# Patient Record
Sex: MALE | Race: WHITE | Employment: OTHER | URBAN - METROPOLITAN AREA
[De-identification: names, ages, dates, MRNs, and addresses within clinical notes are randomized per-mention and may not be internally consistent; named-entity substitution may affect disease eponyms.]

---

## 2020-09-10 ENCOUNTER — LAB ENCOUNTER (OUTPATIENT)
Dept: LAB | Facility: HOSPITAL | Age: 45
End: 2020-09-10
Attending: FAMILY MEDICINE
Payer: COMMERCIAL

## 2020-09-10 DIAGNOSIS — Z20.828 EXPOSURE TO SARS-ASSOCIATED CORONAVIRUS: Primary | ICD-10-CM

## 2020-09-10 DIAGNOSIS — Z11.59 ENCOUNTER FOR SCREENING FOR OTHER VIRAL DISEASES: ICD-10-CM

## 2020-09-10 DIAGNOSIS — Z20.828 EXPOSURE TO SARS-ASSOCIATED CORONAVIRUS: ICD-10-CM

## 2020-09-10 DIAGNOSIS — Z11.59 ENCOUNTER FOR SCREENING FOR OTHER VIRAL DISEASES: Primary | ICD-10-CM

## 2020-09-12 LAB — SARS-COV-2 RNA RESP QL NAA+PROBE: NOT DETECTED

## 2021-12-07 PROBLEM — Z12.11 COLON CANCER SCREENING: Status: ACTIVE | Noted: 2021-12-07

## 2022-03-17 ENCOUNTER — OFFICE VISIT (OUTPATIENT)
Dept: OTOLARYNGOLOGY | Facility: CLINIC | Age: 47
End: 2022-03-17
Payer: COMMERCIAL

## 2022-03-17 VITALS — BODY MASS INDEX: 26.46 KG/M2 | HEIGHT: 71 IN | WEIGHT: 189 LBS

## 2022-03-17 DIAGNOSIS — J31.0 CHRONIC RHINITIS: ICD-10-CM

## 2022-03-17 DIAGNOSIS — H61.23 BILATERAL IMPACTED CERUMEN: Primary | ICD-10-CM

## 2022-03-17 PROCEDURE — 3008F BODY MASS INDEX DOCD: CPT | Performed by: OTOLARYNGOLOGY

## 2022-03-17 PROCEDURE — 69210 REMOVE IMPACTED EAR WAX UNI: CPT | Performed by: OTOLARYNGOLOGY

## 2022-03-17 RX ORDER — FLUTICASONE PROPIONATE 50 MCG
2 SPRAY, SUSPENSION (ML) NASAL DAILY
Qty: 3 EACH | Refills: 4 | Status: SHIPPED | OUTPATIENT
Start: 2022-03-17

## 2022-03-17 RX ORDER — FLUTICASONE PROPIONATE 50 MCG
2 SPRAY, SUSPENSION (ML) NASAL DAILY
Qty: 3 EACH | Refills: 4 | Status: SHIPPED | OUTPATIENT
Start: 2022-03-17 | End: 2022-03-17

## 2022-06-10 ENCOUNTER — APPOINTMENT (OUTPATIENT)
Dept: GENERAL RADIOLOGY | Age: 47
End: 2022-06-10
Attending: EMERGENCY MEDICINE
Payer: COMMERCIAL

## 2022-06-10 ENCOUNTER — HOSPITAL ENCOUNTER (OUTPATIENT)
Age: 47
Discharge: HOME | End: 2022-06-10
Attending: EMERGENCY MEDICINE
Payer: COMMERCIAL

## 2022-06-10 VITALS
DIASTOLIC BLOOD PRESSURE: 74 MMHG | TEMPERATURE: 99 F | OXYGEN SATURATION: 100 % | SYSTOLIC BLOOD PRESSURE: 114 MMHG | HEART RATE: 89 BPM | RESPIRATION RATE: 18 BRPM

## 2022-06-10 DIAGNOSIS — S62.657A NONDISPLACED FRACTURE OF MIDDLE PHALANX OF LEFT LITTLE FINGER, INITIAL ENCOUNTER FOR CLOSED FRACTURE: Primary | ICD-10-CM

## 2022-06-10 DIAGNOSIS — B37.2 CANDIDAL INTERTRIGO: ICD-10-CM

## 2022-06-10 PROCEDURE — 26720 TREAT FINGER FRACTURE EACH: CPT

## 2022-06-10 PROCEDURE — 99213 OFFICE O/P EST LOW 20 MIN: CPT

## 2022-06-10 PROCEDURE — 73140 X-RAY EXAM OF FINGER(S): CPT | Performed by: EMERGENCY MEDICINE

## 2022-06-10 PROCEDURE — 99203 OFFICE O/P NEW LOW 30 MIN: CPT

## 2022-06-10 RX ORDER — CLOTRIMAZOLE 1 %
1 CREAM (GRAM) TOPICAL 2 TIMES DAILY
Qty: 12 G | Refills: 0 | Status: SHIPPED | OUTPATIENT
Start: 2022-06-10 | End: 2022-06-15

## 2022-06-10 NOTE — ED INITIAL ASSESSMENT (HPI)
Injured left 5th finger while moving boxes 2 weeks ago. Bruising and swelling present. Little improvement since injury.

## 2022-12-04 PROBLEM — M15.0 PRIMARY OSTEOARTHRITIS INVOLVING MULTIPLE JOINTS: Status: ACTIVE | Noted: 2022-12-04

## 2022-12-04 PROBLEM — Z86.0100 HISTORY OF COLON POLYPS: Status: ACTIVE | Noted: 2022-12-04

## 2022-12-04 PROBLEM — J30.2 SEASONAL ALLERGIC RHINITIS: Status: ACTIVE | Noted: 2022-12-04

## 2022-12-04 PROBLEM — Z86.010 HISTORY OF COLON POLYPS: Status: ACTIVE | Noted: 2022-12-04

## 2022-12-04 PROBLEM — M15.9 PRIMARY OSTEOARTHRITIS INVOLVING MULTIPLE JOINTS: Status: ACTIVE | Noted: 2022-12-04

## 2022-12-04 NOTE — PATIENT INSTRUCTIONS
- You were seen in clinic for regular annual check-up. We have ordered labs for you and we will call you with the results.   -For your urinary symptoms, will check a urine test and a prostate level given your family history of prostate cancer. We will try to decrease the liquids 2 hours before bedtime. Maintain all your liquid intake in the morning during the daytime and try to cut off after dinner  - For the skin rash, this does look like a fungal infection. Lets try a course of clotrimazole-betamethasone 1 application twice a day for at least 7 days or until symptoms improve. You may use on an as-needed basis  - You do have some sebaceous cysts or possible small lipomas, as long as they do not enlarge or not causing any problems we can watch them for now.  -Your next colonoscopy will be due 2028 with Dr. Adina Ceballos  -Vaccines you may be due for: Tetanus shot, COVID dose #4, flu shot  - Please continue to eat a varied diet including recommended servings of vegetables, fruits, and low fat dairy. Minimize high saturated fats (such as fast foods) and high sugar intake (such as soda)  - We recommend 150 minutes of moderate intensity exercise (brisk walking, swimming) weekly to maintain your current weight. Targeted weight loss will require more vigorous exercise or more than 150 minutes/week. Return to clinic in 6-12 months for follow-up    Feel free to call, send a [employer] message, or make a sooner appointment if any new medical concerns arise.

## 2022-12-06 ENCOUNTER — OFFICE VISIT (OUTPATIENT)
Dept: INTERNAL MEDICINE CLINIC | Facility: CLINIC | Age: 47
End: 2022-12-06
Payer: COMMERCIAL

## 2022-12-06 VITALS
BODY MASS INDEX: 26.6 KG/M2 | DIASTOLIC BLOOD PRESSURE: 66 MMHG | HEIGHT: 71 IN | OXYGEN SATURATION: 99 % | WEIGHT: 190 LBS | TEMPERATURE: 98 F | HEART RATE: 62 BPM | SYSTOLIC BLOOD PRESSURE: 110 MMHG

## 2022-12-06 DIAGNOSIS — Z13.29 SCREENING FOR THYROID DISORDER: ICD-10-CM

## 2022-12-06 DIAGNOSIS — L72.3 SEBACEOUS CYST: ICD-10-CM

## 2022-12-06 DIAGNOSIS — R35.1 NOCTURIA: ICD-10-CM

## 2022-12-06 DIAGNOSIS — Z80.42 FAMILY HISTORY OF PROSTATE CANCER: ICD-10-CM

## 2022-12-06 DIAGNOSIS — Z13.220 SCREENING FOR LIPOID DISORDERS: ICD-10-CM

## 2022-12-06 DIAGNOSIS — Z13.0 SCREENING FOR DEFICIENCY ANEMIA: ICD-10-CM

## 2022-12-06 DIAGNOSIS — M15.9 PRIMARY OSTEOARTHRITIS INVOLVING MULTIPLE JOINTS: ICD-10-CM

## 2022-12-06 DIAGNOSIS — Z00.00 ANNUAL PHYSICAL EXAM: Primary | ICD-10-CM

## 2022-12-06 DIAGNOSIS — Z13.1 SCREENING FOR DIABETES MELLITUS: ICD-10-CM

## 2022-12-06 DIAGNOSIS — Z86.010 HISTORY OF COLON POLYPS: ICD-10-CM

## 2022-12-06 DIAGNOSIS — J30.2 SEASONAL ALLERGIC RHINITIS, UNSPECIFIED TRIGGER: ICD-10-CM

## 2022-12-06 PROCEDURE — 3008F BODY MASS INDEX DOCD: CPT | Performed by: INTERNAL MEDICINE

## 2022-12-06 PROCEDURE — 90471 IMMUNIZATION ADMIN: CPT | Performed by: INTERNAL MEDICINE

## 2022-12-06 PROCEDURE — 3074F SYST BP LT 130 MM HG: CPT | Performed by: INTERNAL MEDICINE

## 2022-12-06 PROCEDURE — 3078F DIAST BP <80 MM HG: CPT | Performed by: INTERNAL MEDICINE

## 2022-12-06 PROCEDURE — 90686 IIV4 VACC NO PRSV 0.5 ML IM: CPT | Performed by: INTERNAL MEDICINE

## 2022-12-06 PROCEDURE — 99396 PREV VISIT EST AGE 40-64: CPT | Performed by: INTERNAL MEDICINE

## 2022-12-06 RX ORDER — ASCORBIC ACID 500 MG
500 TABLET ORAL DAILY
COMMUNITY

## 2022-12-06 RX ORDER — CLOTRIMAZOLE AND BETAMETHASONE DIPROPIONATE 10; .64 MG/G; MG/G
1 CREAM TOPICAL 2 TIMES DAILY PRN
Qty: 60 G | Refills: 0 | Status: SHIPPED | OUTPATIENT
Start: 2022-12-06

## 2022-12-07 PROBLEM — R35.1 NOCTURIA: Status: ACTIVE | Noted: 2022-12-07

## 2022-12-07 PROBLEM — L72.3 SEBACEOUS CYST: Status: ACTIVE | Noted: 2022-12-07

## 2022-12-08 ENCOUNTER — LAB ENCOUNTER (OUTPATIENT)
Dept: LAB | Age: 47
End: 2022-12-08
Attending: INTERNAL MEDICINE
Payer: COMMERCIAL

## 2022-12-08 DIAGNOSIS — Z80.42 FAMILY HISTORY OF PROSTATE CANCER: ICD-10-CM

## 2022-12-08 LAB
ALBUMIN SERPL-MCNC: 3.7 G/DL (ref 3.4–5)
ALBUMIN/GLOB SERPL: 1.1 {RATIO} (ref 1–2)
ALP LIVER SERPL-CCNC: 40 U/L
ALT SERPL-CCNC: 40 U/L
ANION GAP SERPL CALC-SCNC: 4 MMOL/L (ref 0–18)
AST SERPL-CCNC: 15 U/L (ref 15–37)
BASOPHILS # BLD AUTO: 0.02 X10(3) UL (ref 0–0.2)
BASOPHILS NFR BLD AUTO: 0.3 %
BILIRUB SERPL-MCNC: 0.8 MG/DL (ref 0.1–2)
BILIRUB UR QL: NEGATIVE
BUN BLD-MCNC: 16 MG/DL (ref 7–18)
BUN/CREAT SERPL: 16 (ref 10–20)
CALCIUM BLD-MCNC: 9.2 MG/DL (ref 8.5–10.1)
CHLORIDE SERPL-SCNC: 110 MMOL/L (ref 98–112)
CHOLEST SERPL-MCNC: 184 MG/DL (ref ?–200)
CLARITY UR: CLEAR
CO2 SERPL-SCNC: 29 MMOL/L (ref 21–32)
COLOR UR: YELLOW
COMPLEXED PSA SERPL-MCNC: 1.06 NG/ML (ref ?–4)
CREAT BLD-MCNC: 1 MG/DL
DEPRECATED RDW RBC AUTO: 43.9 FL (ref 35.1–46.3)
EOSINOPHIL # BLD AUTO: 0.1 X10(3) UL (ref 0–0.7)
EOSINOPHIL NFR BLD AUTO: 1.6 %
ERYTHROCYTE [DISTWIDTH] IN BLOOD BY AUTOMATED COUNT: 13.2 % (ref 11–15)
FASTING PATIENT LIPID ANSWER: YES
FASTING STATUS PATIENT QL REPORTED: YES
GFR SERPLBLD BASED ON 1.73 SQ M-ARVRAT: 94 ML/MIN/1.73M2 (ref 60–?)
GLOBULIN PLAS-MCNC: 3.4 G/DL (ref 2.8–4.4)
GLUCOSE BLD-MCNC: 103 MG/DL (ref 70–99)
GLUCOSE UR-MCNC: NEGATIVE MG/DL
HCT VFR BLD AUTO: 44.8 %
HDLC SERPL-MCNC: 57 MG/DL (ref 40–59)
HGB BLD-MCNC: 15.2 G/DL
HGB UR QL STRIP.AUTO: NEGATIVE
IMM GRANULOCYTES # BLD AUTO: 0.02 X10(3) UL (ref 0–1)
IMM GRANULOCYTES NFR BLD: 0.3 %
LDLC SERPL CALC-MCNC: 113 MG/DL (ref ?–100)
LEUKOCYTE ESTERASE UR QL STRIP.AUTO: NEGATIVE
LYMPHOCYTES # BLD AUTO: 1.9 X10(3) UL (ref 1–4)
LYMPHOCYTES NFR BLD AUTO: 29.7 %
MCH RBC QN AUTO: 30.9 PG (ref 26–34)
MCHC RBC AUTO-ENTMCNC: 33.9 G/DL (ref 31–37)
MCV RBC AUTO: 91.1 FL
MONOCYTES # BLD AUTO: 0.48 X10(3) UL (ref 0.1–1)
MONOCYTES NFR BLD AUTO: 7.5 %
NEUTROPHILS # BLD AUTO: 3.88 X10 (3) UL (ref 1.5–7.7)
NEUTROPHILS # BLD AUTO: 3.88 X10(3) UL (ref 1.5–7.7)
NEUTROPHILS NFR BLD AUTO: 60.6 %
NITRITE UR QL STRIP.AUTO: NEGATIVE
NONHDLC SERPL-MCNC: 127 MG/DL (ref ?–130)
OSMOLALITY SERPL CALC.SUM OF ELEC: 297 MOSM/KG (ref 275–295)
PH UR: 6 [PH] (ref 5–8)
PLATELET # BLD AUTO: 184 10(3)UL (ref 150–450)
POTASSIUM SERPL-SCNC: 4 MMOL/L (ref 3.5–5.1)
PROT SERPL-MCNC: 7.1 G/DL (ref 6.4–8.2)
PROT UR-MCNC: NEGATIVE MG/DL
RBC # BLD AUTO: 4.92 X10(6)UL
SODIUM SERPL-SCNC: 143 MMOL/L (ref 136–145)
SP GR UR STRIP: >=1.03 (ref 1–1.03)
TRIGL SERPL-MCNC: 73 MG/DL (ref 30–149)
TSI SER-ACNC: 0.36 MIU/ML (ref 0.36–3.74)
UROBILINOGEN UR STRIP-ACNC: 0.2
VLDLC SERPL CALC-MCNC: 13 MG/DL (ref 0–30)
WBC # BLD AUTO: 6.4 X10(3) UL (ref 4–11)

## 2022-12-08 PROCEDURE — 85025 COMPLETE CBC W/AUTO DIFF WBC: CPT | Performed by: INTERNAL MEDICINE

## 2022-12-08 PROCEDURE — 80053 COMPREHEN METABOLIC PANEL: CPT | Performed by: INTERNAL MEDICINE

## 2022-12-08 PROCEDURE — 84443 ASSAY THYROID STIM HORMONE: CPT | Performed by: INTERNAL MEDICINE

## 2022-12-08 PROCEDURE — 36415 COLL VENOUS BLD VENIPUNCTURE: CPT | Performed by: INTERNAL MEDICINE

## 2022-12-08 PROCEDURE — 80061 LIPID PANEL: CPT | Performed by: INTERNAL MEDICINE

## 2022-12-08 PROCEDURE — 81003 URINALYSIS AUTO W/O SCOPE: CPT | Performed by: INTERNAL MEDICINE

## 2022-12-10 ENCOUNTER — TELEPHONE (OUTPATIENT)
Dept: INTERNAL MEDICINE CLINIC | Facility: CLINIC | Age: 47
End: 2022-12-10

## 2022-12-10 NOTE — TELEPHONE ENCOUNTER
Please notify patient that I reviewed the blood work from 12/8    Labs  Liver, kidney, electrolytes, blood counts, thyroid all look  Cholesterol is only borderline elevated (ASCVD 1.3% no medications warranted, continue targeted weight loss over time  Prostate level is normal and urinary tests also look good        Recommendations  No medications that we need at this time.   Lets see if we can reduce fluids right before bedtime but stay hydrated during the day  He is doing good job with nutrition and exercise and this should help control his cholesterol further

## 2022-12-14 ENCOUNTER — TELEPHONE (OUTPATIENT)
Dept: INTERNAL MEDICINE CLINIC | Facility: CLINIC | Age: 47
End: 2022-12-14

## 2022-12-14 NOTE — TELEPHONE ENCOUNTER
Did receive outside hospital records    Labs 12/21/2021  - CBC within normal limits  - Total cholesterol 212, , HDL 60, triglycerides 66  - CMP: Unremarkable  - TSH within normal    Office visit 12/27/2021-Dr. Lyon  Swelling of lymph nodes after COVID booster #2 history of GERD, allergic asthma  - C-scope 1 polyp 2016, bilateral herniorrhaphy 1976  - Family history: Mother pancreatic cancer, diabetes  - Maternal grandfather colon cancer  - Maternal cousin with colon polyps    Nystatin topical 100,000 units/g powder 1 application 3 times a day      Office visit 12/1/2021  - New patient physical  - Concern for osteoarthritis of both knees, referred to orthopedic surgery Dr. Pancho Aquino  - Allergic rhinitis: Flonase, antihistamine  - Tinea corporis on nystatin topical powder    Sent to scanning.

## 2023-03-02 ENCOUNTER — HOSPITAL ENCOUNTER (OUTPATIENT)
Age: 48
Discharge: HOME | End: 2023-03-02
Payer: COMMERCIAL

## 2023-03-02 VITALS
SYSTOLIC BLOOD PRESSURE: 107 MMHG | TEMPERATURE: 97 F | RESPIRATION RATE: 16 BRPM | DIASTOLIC BLOOD PRESSURE: 73 MMHG | OXYGEN SATURATION: 100 % | HEART RATE: 68 BPM

## 2023-03-02 DIAGNOSIS — H60.501 ACUTE OTITIS EXTERNA OF RIGHT EAR, UNSPECIFIED TYPE: ICD-10-CM

## 2023-03-02 DIAGNOSIS — H61.23 BILATERAL IMPACTED CERUMEN: Primary | ICD-10-CM

## 2023-03-02 PROCEDURE — 99203 OFFICE O/P NEW LOW 30 MIN: CPT

## 2023-03-02 RX ORDER — OFLOXACIN 3 MG/ML
5 SOLUTION AURICULAR (OTIC) DAILY
Qty: 10 ML | Refills: 0 | Status: SHIPPED | OUTPATIENT
Start: 2023-03-02 | End: 2023-03-09

## 2023-03-02 NOTE — ED INITIAL ASSESSMENT (HPI)
Pt complaining of cold symptoms since last week. Pt states now has bilateral ear fullness. No ear pain.

## 2024-01-21 NOTE — H&P
Juan Antonio Cadet is a 48 year old male.    HPI:     Chief Complaint   Patient presents with    Physical     Here today for Annual Physical Exam     Mr. CADET is a 48 year old male osteoarthritis, allergic rhinitis, anxiety coming in for annual physical examination    Overall doing well. Work is keeping busy. Doing well with the family.    R foot plantar fasciitis for the last 6 months. Worse with being on the feet. Using new orthotics. Trying to use supportive shoes. He can still run.     Tchol 219 was   BMI has increased      Has family history of cysts in the family. R maxillary cyst was removed at 19. R forearm cyst x 25 years. Same size, not bothering him. Mostly on the R side. R abdomen, R thigh with some spots.     He was hoping to have some spots removed in the right forearm.  There are some red spots that are small and have come up.  He does have stable growths of the right forearm, right thigh, right abdomen.    I reviewed and updated the PMHx, FamHx, medications, allergies, and SocHx as below with the patient    SocHX  - Home: feels safe at home; wife and 3 kids.  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  - Sexual Activity: with wife  - Hobbies: exercise, traveling,   - Nutrition: salads, veggies. May snack throughout the day. Sometimes eats out.   - Physical Activity: [employer] - full body exercises, squatting can cause pain. He did cross fit (lots of squatting); he does stay away from the squats.      HISTORY:  Past Medical History:   Diagnosis Date    Anxiety state       Past Surgical History:   Procedure Laterality Date    COLONOSCOPY N/A 12/7/2021    Procedure: COLONOSCOPY with polypectomy;  Surgeon: Art Pantoja MD;  [facility] [facility]    HERNIA SURGERY      as a child      Family History   Problem Relation Age of Onset    Cancer Mother         pancreatic    Arthritis Father         Psoriatic arthritis    Cancer Maternal  Grandfather         prostate    Cancer Other         Colon cancer - cousin      Social History:   Social History     Socioeconomic History    Marital status:    Tobacco Use    Smoking status: Never    Smokeless tobacco: Never   Vaping Use    Vaping Use: Never used   Substance and Sexual Activity    Alcohol use: Yes     Comment: rarely    Drug use: Never        Medications (Active prior to today's visit):  Current Outpatient Medications   Medication Sig Dispense Refill    Cholecalciferol 125 MCG (5000 UT) Oral Tab Take 1 tablet (5,000 Units total) by mouth daily.      Vitamin C 500 MG Oral Tab Take 1 tablet (500 mg total) by mouth daily.      B Complex-C-Folic Acid Oral Tab Take by mouth as needed.      clotrimazole-betamethasone 1-0.05 % External Cream Apply 1 Application. topically 2 (two) times daily as needed. 60 g 0       Allergies:  Allergies   Allergen Reactions    Penicillins UNKNOWN, HIVES and OTHER (SEE COMMENTS)         ROS:   Positive Findings indicated in BOLD    Constitutional: Fever, Chills, Weight Gain, Weight Loss, Night Sweats, Fatigue, Malaise  ENT/Mouth:  Hearing Changes, Ear Pain, Nasal Congestion, Sinus Pain, Hoarseness, Sore throat, Rhinorrhea, Swallowing Difficulty  Eyes: Eye Pain, Swelling, Redness, Foreign Body, Discharge, Vision Changes  Cardiovascular: Chest Pain, SOB, PND, Dyspnea on Exertion, Orthopnea, Claudication, Edema, Palpitations  Respiratory: Cough, Sputum, Wheezing, Shortness of breath  Gastrointestinal: Nausea, Vomiting, Diarrhea, Constipation, Pain, Heartburn, Dysphagia, Bloody stools, Tarry stools  Genitourinary: Dysmenorrhea, Dysuria, Urinary Frequency, Hematuria, Urinary Incontinence, Urgency,  Flank Pain  Musculoskeletal: Arthralgias, Myalgias, Joint Swelling, Joint Stiffness, Back Pain, Neck Pain, foot pain  Integumentary: Skin Lesions, Pruritis, Hair Changes, Jaundice, Nail changes  Neuro: Weakness, Numbness, Paresthesias, Loss of Consciousness, Syncope,  Dizziness, Headache, Falls  Psych: Anxiety, Depression, Insomnia, Suicidal Ideation, Homicidal ideation, Memory Changes  Heme/Lymph: Bruising, Bleeding, Lymphadenopathy  Endocrine: Polyuria, Polydipsia, Temperature Intolerance    PHYSICAL EXAM:   Vital Signs:  Blood pressure 100/60, pulse 68, height 5' 11\" (1.803 m), weight 207 lb (93.9 kg), SpO2 97%.     Constitutional: No acute distress. Alert and oriented x 3.  Eyes: EOMI, PERRLA, clear sclera b/l  HENT: NCAT, Moist mucous membranes, Oropharynx without erythema or exudates  Neck: No JVD, no thyromegaly  Cardiovascular: S1, S2, no S3, no S4, Regular rate and rhythm, No murmurs/gallops/rubs.   Vascular: Equal pulses 2+ carotids no bruits or thrills/radial/DP/PT bilaterally  Respiratory: Clear to auscultation bilaterally.  No wheezes/rales/rhonchi  Gastrointestinal: Soft, nontender, nondistended. Positive bowel sounds x 4. No rebound tenderness. No hepatomegaly, No splenomegaly  Genitourinary: No CVA tenderness bilaterally  Neurologic: No focal neurological deficits, CN II-XII intact, light touch intact, MSK Strength 5/5 and symmetric in all extremities, normal gait, 2+ patellar tendon  Musculoskeletal: Full range of motion of all extremities, no clubbing/swelling/edema  Skin: + Small scattered subcutaneous nodules, nontender, most prominent in the right medial forearm- mobile without overlying skin   + Varicose veins bilateral thighs, more so on the left knee  Psychiatric: Appropriate mood and affect  Heme/Lymph/Immune: No cervical LAD      DATA REVIEWED   Labs:  No results found for this or any previous visit (from the past 8760 hour(s)).    No results found for this or any previous visit (from the past 8760 hour(s)).    Cholesterol, Total (mg/dL)   Date Value   12/08/2022 184     HDL Cholesterol (mg/dL)   Date Value   12/08/2022 57     LDL Cholesterol (mg/dL)   Date Value   12/08/2022 113 (H)     Triglycerides (mg/dL)   Date Value   12/08/2022 73       Last A1c  value was  % done  .      Colonoscopy 12/7/2021  A.  Transverse colon polyp:  -Tubular adenoma.    B.  Sigmoid colon polyp:  -Hyperplastic polyp.    ASSESSMENT/PLAN:     Skin lesions  Conducted a brief skin examination with patient:  - Concern for a right skin spot of the right maxilla, possibly café au lait spot?  - Multiple cherry angiomas which seem to be new along the right forearm.  His request is to have them excised with dermatology  - Varicose veins bilateral thighs, coalescence of varicose veins along the left medial thigh  - Subcutaneous nodules as below.  - Referral for dermatology, Dr. Hicks    Plantar fasciitis  Ongoing over the last 6 months, follows with podiatry outside of the [facility] system  - Has orthotics in place  - Provided further resources for stretches and exercises to the area  - Would like to defer Medrol Dosepak as it did not seem to help previously  - Will follow with his podiatrist for possible injection if indicated.    Hyperlipidemia  Last lipid panel with borderline LDL cholesterol 113  - Repeat fasting lipid panel  - Try to target weight loss over time with good nutrition and exercise    Subcutaneous nodules  Multiple scattered likely lipomas versus subcutaneous sebaceous cysts  - No significant impact on his ADLs, not enlarging  - We will continue to monitor for now and he will notify us if there is any clinical change requiring excision    Nocturia  Seems to be relatively new symptom, eating to urinate 0-upwards of 2 times in the evening.  Worse with drinking fluids right before bedtime.  - He does have family history of prostate cancer in his maternal grandfather  - Last PSA within normal notes, will repeat    Osteoarthritis of the knees  Some crepitus noted on examination, however patient still able to perform his usual ADLs  - Continue with good exercise habits    Allergic rhinitis  -Well-controlled without the use of medications    History of colon polyps  - Tubular adenoma  noted 12/2021  - Due for colonoscopy 2028 with Dr. Pantoja    Anxiety  -Noted patient reported prior history requiring medications in the past  - Overall well controlled without the need for therapy or medications         Orders This Visit:  No orders of the defined types were placed in this encounter.      Meds This Visit:  Requested Prescriptions      No prescriptions requested or ordered in this encounter       Imaging & Referrals:  None       Health Maintenance  HTN Screen: At goal  DM Screen: Check fasting sugar  HLD Screen: Check fasting lipid panel  Osteoporosis Screen (>65 or < 65 with FRAX > 9.3%): Not indicated  HCV Screen: Considered low risk  HIV Screen: considered low risk  G/C/Syphilis: Considered low risk    Colon Cancer Screening (45-70): As above, next due 2028  Prostate Cancer Screening: (50-70): Check PSA  Lung Cancer Screening (55-79 with 30 p/year and active < 15 years): Not indicated  AAA Screening (65-75 Hx of smoking): Not indicated    Influenza: Annually   Td/Tdap: Last Tdap assess at next visit.   Zoster (50+): Not indicated  HPV (19-26): Not indicated  Pneumococcal: Not indicated    Immunization History   Administered Date(s) Administered    Covid-19 Vaccine Pfizer 30 mcg/0.3 ml 04/20/2021, 05/11/2021, 12/21/2021    FLULAVAL 6 months & older 0.5 ml Prefilled syringe (77652) 12/06/2022    FLUZONE 6 months and older PFS 0.5 ml (71684) 11/19/2021         Return to clinic in 6-12 months for follow-up    Jorge Montanez MD, 01/22/24, 1:23 PM

## 2024-01-21 NOTE — PATIENT INSTRUCTIONS
- You were seen in clinic for regular annual check-up. We have ordered labs for you and we will call you with the results. Please obtain the bloodwork fasting for 10**12 hours. OK to drink water the day of your blood draw.      We have the lab downstairs on the first floor.  No appointment is necessary.  Lab hours are Monday-Friday 7:30 AM to 4:45 PM.  There may be Saturday hours 7 AM-11:00 AM as well.     Otherwise you can obtain the blood tests on the weekends at the main hospital or local immediate care/urgent care within the [facility].    Lets plan to repeat blood test and urine test in about 6 months after we get back on track with good nutrition, exercise habits after the holidays.    We did place a referral for dermatology for further evaluation of some of the red skin spots that may be able to be removed  - These are likely lipomas of the right forearm, right abdomen, right thigh.  No immediate excision is needed.  As long as they remain the same size and not impacting her daily activities, we can monitor for now  - You likely have varicose veins with a collection along the left knee area.  These veins can progress over time can cause swelling, sometimes irritation and pain.  Lets continue with lifting legs up during times of rest, you may consider the use of compression stockings to help promote movement of the blood vessels throughout the legs.  No medications we need for now    -You are eligible for early prostate cancer screening.  Will periodically perform a prostate exam for future physicals.  Lets continue with checking PSA on an annual basis  -Vaccines may be due for: Tdap/tetanus shot, flu shot (given today), COVID dose #4  - Please continue to eat a varied diet including recommended servings of vegetables, fruits, and low fat dairy. Minimize high saturated fats (such as fast foods) and high sugar intake (such as soda)  - We recommend 150 minutes of moderate intensity exercise (brisk  walking, swimming) weekly to maintain your current weight.  Targeted weight loss will require more vigorous exercise or more than 150 minutes/week.    Return to clinic in 6-12 months for follow-up

## 2024-01-22 ENCOUNTER — OFFICE VISIT (OUTPATIENT)
Dept: INTERNAL MEDICINE CLINIC | Facility: CLINIC | Age: 49
End: 2024-01-22
Payer: COMMERCIAL

## 2024-01-22 VITALS
OXYGEN SATURATION: 97 % | SYSTOLIC BLOOD PRESSURE: 100 MMHG | HEIGHT: 71 IN | WEIGHT: 207 LBS | BODY MASS INDEX: 28.98 KG/M2 | HEART RATE: 68 BPM | DIASTOLIC BLOOD PRESSURE: 60 MMHG

## 2024-01-22 DIAGNOSIS — L98.9 SKIN LESION: ICD-10-CM

## 2024-01-22 DIAGNOSIS — D18.01 CHERRY ANGIOMA: ICD-10-CM

## 2024-01-22 DIAGNOSIS — R35.1 NOCTURIA: ICD-10-CM

## 2024-01-22 DIAGNOSIS — M15.9 PRIMARY OSTEOARTHRITIS INVOLVING MULTIPLE JOINTS: ICD-10-CM

## 2024-01-22 DIAGNOSIS — Z80.42 FAMILY HISTORY OF PROSTATE CANCER: ICD-10-CM

## 2024-01-22 DIAGNOSIS — Z13.220 SCREENING FOR LIPOID DISORDERS: ICD-10-CM

## 2024-01-22 DIAGNOSIS — J30.2 SEASONAL ALLERGIC RHINITIS, UNSPECIFIED TRIGGER: ICD-10-CM

## 2024-01-22 DIAGNOSIS — Z13.0 SCREENING FOR DEFICIENCY ANEMIA: ICD-10-CM

## 2024-01-22 DIAGNOSIS — Z13.29 SCREENING FOR THYROID DISORDER: ICD-10-CM

## 2024-01-22 DIAGNOSIS — Z00.00 ANNUAL PHYSICAL EXAM: Primary | ICD-10-CM

## 2024-01-22 DIAGNOSIS — Z13.1 SCREENING FOR DIABETES MELLITUS: ICD-10-CM

## 2024-01-22 PROCEDURE — 3074F SYST BP LT 130 MM HG: CPT | Performed by: INTERNAL MEDICINE

## 2024-01-22 PROCEDURE — 90471 IMMUNIZATION ADMIN: CPT | Performed by: INTERNAL MEDICINE

## 2024-01-22 PROCEDURE — 99396 PREV VISIT EST AGE 40-64: CPT | Performed by: INTERNAL MEDICINE

## 2024-01-22 PROCEDURE — 90686 IIV4 VACC NO PRSV 0.5 ML IM: CPT | Performed by: INTERNAL MEDICINE

## 2024-01-22 PROCEDURE — 3078F DIAST BP <80 MM HG: CPT | Performed by: INTERNAL MEDICINE

## 2024-01-22 PROCEDURE — 3008F BODY MASS INDEX DOCD: CPT | Performed by: INTERNAL MEDICINE

## 2024-01-22 RX ORDER — CLOTRIMAZOLE AND BETAMETHASONE DIPROPIONATE 10; .64 MG/G; MG/G
1 CREAM TOPICAL 2 TIMES DAILY PRN
Qty: 60 G | Refills: 0 | Status: SHIPPED | OUTPATIENT
Start: 2024-01-22

## 2024-01-24 ENCOUNTER — OFFICE VISIT (OUTPATIENT)
Dept: DERMATOLOGY CLINIC | Facility: CLINIC | Age: 49
End: 2024-01-24

## 2024-01-24 DIAGNOSIS — L81.4 LENTIGINES: ICD-10-CM

## 2024-01-24 DIAGNOSIS — D18.01 CHERRY ANGIOMA: ICD-10-CM

## 2024-01-24 DIAGNOSIS — L82.1 SEBORRHEIC KERATOSES: Primary | ICD-10-CM

## 2024-01-24 DIAGNOSIS — D22.9 MULTIPLE BENIGN NEVI: ICD-10-CM

## 2024-01-24 PROCEDURE — 17110 DESTRUCTION B9 LES UP TO 14: CPT | Performed by: STUDENT IN AN ORGANIZED HEALTH CARE EDUCATION/TRAINING PROGRAM

## 2024-01-24 PROCEDURE — 99203 OFFICE O/P NEW LOW 30 MIN: CPT | Performed by: STUDENT IN AN ORGANIZED HEALTH CARE EDUCATION/TRAINING PROGRAM

## 2024-01-24 NOTE — PROGRESS NOTES
January 24, 2024    New patient     CHIEF COMPLAINT: FBSE    HISTORY OF PRESENT ILLNESS: .    1. Cherry Angioma  Location: Overall   Duration: Many years  Signs and symptoms: NONE  Current treatment: NONE    2. Brown spot (temple) White spot-Left lower leg  Location: Right temple area, Left lower leg  Duration: 2 years  Signs and symptoms: NONE  Current treatment: NONE    DERM HISTORY:  History of skin cancer: No    FAMILY HISTORY:  History of melanoma: No    PAST MEDICAL HISTORY:  Past Medical History:   Diagnosis Date    Anxiety state        REVIEW OF SYSTEMS:  Constitutional: Denies fever, chills, unintentional weight loss.   Skin as per [facility]    Medications  Current Outpatient Medications   Medication Sig Dispense Refill    clotrimazole-betamethasone 1-0.05 % External Cream Apply 1 Application  topically 2 (two) times daily as needed. 60 g 0    Cholecalciferol 125 MCG (5000 UT) Oral Tab Take 1 tablet (5,000 Units total) by mouth daily.      Vitamin C 500 MG Oral Tab Take 1 tablet (500 mg total) by mouth daily.      B Complex-C-Folic Acid Oral Tab Take by mouth as needed.         PHYSICAL EXAM:  General: awake, alert, no acute distress  Skin: Skin exam was performed today including the following: head and face, scalp, neck, chest (including breasts and axillae), abdomen, back, bilateral upper extremities, bilateral lower extremities, hands, feet, digits, nails. Pertinent findings include:   - Scattered bright red-purple dome-shaped papules on the trunk and extremities   - Scattered light brown stellate macules on sun exposed sites  - Scattered, evenly colored, round brown macules and papules with regular borders on the trunk and extremities  - Numerous scattered skin-colored and brown, waxy, stuck-on papules and plaques on the trunk and extremities      ASSESSMENT & PLAN:  Pathophysiology of diagnoses discussed with patient.  Therapeutic options reviewed. Risks, benefits, and alternatives discussed with patient.  Instructions reviewed at length.    #Lentigines  #Seborrheic keratoses   #Cherry angiomas   - Reassurance provided regarding the benign nature of these lesions.    #Multiple benign nevi  - Complete skin exam performed today with no outlier lesions identified   - Reassured patient of benign nature of these lesions.   - Recommend daily photoprotection with broad-spectrum sunscreen, avoidance of sun during peak hours, and sun protective clothing.    - Dermoscopy was used for physical examination of pigmented lesions during today's office visit.    #Tinea cruris  - Recommended OTC lotrimin ultra twice daily  for 6 weeks. In reserve: terbinafine     #Verruca Vulgaris, neck  - Cautery, low, blunt tip at 1.8 to lesions on neck x 2  Risks (including, but not limited to scarring, pain, bleeding, infection, dyschromia) benefits, alternatives and personnel discussed with patient who consents to proceed. Vaseline applied after      Return to clinic:  6 weeks  or sooner if something concerning arises     Tommie Hicks MD

## 2024-03-06 ENCOUNTER — OFFICE VISIT (OUTPATIENT)
Dept: DERMATOLOGY CLINIC | Facility: CLINIC | Age: 49
End: 2024-03-06

## 2024-03-06 DIAGNOSIS — B35.6 TINEA CRURIS: Primary | ICD-10-CM

## 2024-03-06 PROCEDURE — 99213 OFFICE O/P EST LOW 20 MIN: CPT | Performed by: STUDENT IN AN ORGANIZED HEALTH CARE EDUCATION/TRAINING PROGRAM

## 2024-03-06 RX ORDER — GENTAMICIN SULFATE 1 MG/G
OINTMENT TOPICAL
Qty: 30 G | Refills: 1 | Status: SHIPPED | OUTPATIENT
Start: 2024-03-06

## 2024-03-06 NOTE — PROGRESS NOTES
March 6, 2024    Established patient     CHIEF COMPLAINT: Tinea cruris/6 week f/u    HISTORY OF PRESENT ILLNESS: .    1. Tinea cruris  Location: Groin area   Signs and symptoms: Improvement  Current treatment: Clotrimazole-betamethasone 1-0.05% external cream  Past treatments: None      DERM HISTORY:  History of skin cancer: No  History of chronic skin disease/condition: No    FAMILY HISTORY:  History of melanoma: No  History of chronic skin disease/condition: No    History/Other:    REVIEW OF SYSTEMS:  Constitutional: Denies fever, chills, unintentional weight loss.   Skin as per HPI    PAST MEDICAL HISTORY:  Past Medical History:   Diagnosis Date    Anxiety state        Medications  Current Outpatient Medications   Medication Sig Dispense Refill    clotrimazole-betamethasone 1-0.05 % External Cream Apply 1 Application  topically 2 (two) times daily as needed. 60 g 0    Cholecalciferol 125 MCG (5000 UT) Oral Tab Take 1 tablet (5,000 Units total) by mouth daily.      Vitamin C 500 MG Oral Tab Take 1 tablet (500 mg total) by mouth daily.      B Complex-C-Folic Acid Oral Tab Take by mouth as needed.         Objective:    PHYSICAL EXAM:  General: awake, alert, no acute distress  Skin: Skin exam was performed today including the following: groin and feet. Pertinent findings include:   - web spaces with maceration   - groin clear    ASSESSMENT & PLAN:  Pathophysiology of diagnoses discussed with patient.  Therapeutic options reviewed. Risks, benefits, and alternatives discussed with patient. Instructions reviewed at length.    #Tinea cruris   - Clear today. Ok to hold lotrimin ultra    #Gram negative toe web space infection   - Start gentamicin ointment twice daily      Return to clinic: 3 months or sooner if something concerning arises     Tommie Hicks MD

## 2024-12-17 ENCOUNTER — OFFICE VISIT (OUTPATIENT)
Facility: LOCATION | Age: 49
End: 2024-12-17
Payer: COMMERCIAL

## 2024-12-17 DIAGNOSIS — T70.0XXA OTITIC BAROTRAUMA, INITIAL ENCOUNTER: ICD-10-CM

## 2024-12-17 DIAGNOSIS — H92.03 OTALGIA OF BOTH EARS: Primary | ICD-10-CM

## 2024-12-17 PROCEDURE — 99213 OFFICE O/P EST LOW 20 MIN: CPT | Performed by: STUDENT IN AN ORGANIZED HEALTH CARE EDUCATION/TRAINING PROGRAM

## 2024-12-17 PROCEDURE — 92504 EAR MICROSCOPY EXAMINATION: CPT | Performed by: STUDENT IN AN ORGANIZED HEALTH CARE EDUCATION/TRAINING PROGRAM

## 2024-12-17 NOTE — PROGRESS NOTES
[facility]  OTOLARYNGOLOGY - HEAD & NECK SURGERY    12/17/2024     Reason for Consultation:   Bilateral ear fullness, recent ear pain on flight    History of Present Illness:   Patient is a pleasant 49 year old male who is being seen for a sensation of fullness in his ears and some pain in his ears during the send from a flight from [place].  The patient states that he otherwise does not have any recurring issues in his ears.  No recurrent ear infections, otorrhea, vertigo.  He has had exposure to loud noise in the past but has been pretty vigilant about using hearing protection especially during concerts.  He also notes some mild intermittent nasal symptoms.  Has been prescribed Flonase in the past but is not necessarily needed to use it.  Does have some sinus issues in his family, as his father does struggle with sinusitis.  He is here to check his ears and hopefully prevent any barotrauma on his upcoming flight.    Past Medical History  Past Medical History:    Anxiety state       Past Surgical History  Past Surgical History:   Procedure Laterality Date    Colonoscopy N/A 12/7/2021    Procedure: COLONOSCOPY with polypectomy;  Surgeon: Art Pantoja MD;  Location: [facility] [place] [facility]    Hernia surgery      as a child       Family History  Family History   Problem Relation Age of Onset    Cancer Mother         pancreatic    Arthritis Father         Psoriatic arthritis    Cancer Maternal Grandfather         prostate    Cancer Other         Colon cancer - cousin       Social History  Pediatric History   Patient Parents    Not on file     Other Topics Concern    Grew up on a farm Not Asked    History of tanning Not Asked    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 30 g 1    clotrimazole-betamethasone 1-0.05 % External Cream Apply 1 Application  topically 2 (two) times daily as needed. 60 g 0    Cholecalciferol 125 MCG (5000 UT) Oral Tab Take 1 tablet (5,000 Units total) by mouth daily.      Vitamin C 500 MG Oral Tab Take 1 tablet (500 mg total) by mouth daily.      B Complex-C-Folic Acid Oral Tab Take by mouth as needed.         Allergies  Allergies[1]    Review of Systems:   A comprehensive 10 point review of systems was completed.  Pertinent positives and negatives noted in the the HPI.    Physical Exam:   There were no vitals taken for this visit.    GENERAL: No acute distress, Comfortable appearing  FACE: HB 1/6, Normal Animation  HEAD: Normocephalic  EYES: EOMI, pupils equil  EARS: Bilateral Auricles Symmetric  NOSE: Nares patent bilaterally, bilateral inferior turbinates appear normal, septum is fairly midline  ORAL CAVITY: Tongue mobile, Oropharynx clear, Floor of mouth clear, Posterior oropharynx normal  NECK: No palpable lymphadenopathy, thyroid not palpable, nontender    Canals:  Right: Mostly clear  Left: Mostly clear    Tympanic Membranes:  Right: Normal tympanic membrane, with no retraction, middle ear space clear  Left: Normal tympanic membrane. with no retraction middle ear space clear    TM Visualized Method:   Right TM examined via otomicroscopy.   Left TM examined via otomicroscopy.        Results:     Laboratory Data:  Lab Results   Component Value Date    WBC 6.4 12/08/2022    HGB 15.2 12/08/2022    HCT 44.8 12/08/2022    .0 12/08/2022    CREATSERUM 1.00 12/08/2022    BUN 16 12/08/2022     12/08/2022    K 4.0 12/08/2022     12/08/2022    CO2 29.0 12/08/2022     (H) 12/08/2022    CA 9.2 12/08/2022    ALB 3.7 12/08/2022    ALKPHO 40 (L) 12/08/2022    TP 7.1 12/08/2022    AST 15 12/08/2022    ALT 40 12/08/2022    TSH 0.364 12/08/2022         Imaging:  No results  found.      Impression:       ICD-10-CM    1. Otitic barotrauma, initial encounter  T70.0XXA         Recommendations:  On the patient's upcoming flight I would like him to use Afrin nasal spray 2 sprays in each nostril 30 minutes before boarding.  He will also auto insufflate gently upon descent.  He will return to see me if he has any persistent issues.    Thank you for allowing me to participate in the care of your patient.    Randal Leija,    Otolaryngology/Rhinology, Sinus, and Endoscopic Skull Base Surgery  [address] [place] [id number]  Phone [phone]  Fax [phone]  12/17/2024  9:02 AM  12/17/2024          [1]   Allergies  Allergen Reactions    Penicillins UNKNOWN, HIVES and OTHER (SEE COMMENTS)

## 2025-03-02 NOTE — H&P
Juan Antonio Cadet is a 49 year old male.    HPI:     Chief Complaint   Patient presents with    Physical     Concerned about cysts (Rt arm & thigh), agreeable to PE     Mr. CADET is a 49 year old male PMHx osteoarthritis, allergic rhinitis, anxiety coming in for annual physical examination    Overall doing well. No pain anywhere. He may have had a viral infection. Son had flu and strep.     Sore from his boxing classes. Wrsit and shoulders can affect him.     Work-life balance, lots of work. Feels like the winter has been dragging on.  Feels like he can have some seasonal affective disorder.    He is concerned about the skin cysts, R arm most affected, R thigh. Mother with pancreatic cancer age 51-52. Mother's father with prostate cancer.    I reviewed and updated the PMHx, FamHx, medications, allergies, and SocHx as below with the patient    SocHX  - Home: feels safe at home; wife and 3 kids.  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  - Sexual Activity: with wife  - Hobbies: exercise, traveling,   - Nutrition: all over the place, healthy - veggies and fruits. Cookie/brownie. No chips  - Physical Activity: [employer] - full body exercises, squatting can cause pain. Boxing exercise; he does stay away from the squats.[phone] steps. [employer].      HISTORY:  Past Medical History:    Allergic rhinitis    Anxiety    Anxiety state    Esophageal reflux    Hyperlipidemia      Past Surgical History:   Procedure Laterality Date    Colonoscopy N/A 12/7/2021    Procedure: COLONOSCOPY with polypectomy;  Surgeon: Art Pantoja MD;  Location: [facility] [place] [facility]    Hernia surgery      as a child      Family History   Problem Relation Age of Onset    Cancer Mother 51        Pancreatic Cancer    Diabetes Mother         Related to Pancreatic Cancer    Arthritis Father         Psoriatic arthritis    Cancer Maternal Grandfather         Prostate Cancer    Cancer Other          Colon cancer - cousin      Social History:   Social History     Socioeconomic History    Marital status:    Tobacco Use    Smoking status: Never    Smokeless tobacco: Never   Vaping Use    Vaping status: Never Used   Substance and Sexual Activity    Alcohol use: Yes     Alcohol/week: 2.0 standard drinks of alcohol     Types: 2 Standard drinks or equivalent per week     Comment: rarely    Drug use: Never   Other Topics Concern    Reaction to local anesthetic No    Pt has a pacemaker No    Pt has a defibrillator No        Medications (Active prior to today's visit):  Current Outpatient Medications   Medication Sig Dispense Refill    Multiple Vitamin (MULTIVITAMIN ADULT OR) Take 1 tablet by mouth daily.      clotrimazole-betamethasone 1-0.05 % External Cream Apply 1 Application  topically 2 (two) times daily as needed. 60 g 0    Cholecalciferol 125 MCG (5000 UT) Oral Tab Take 1 tablet (5,000 Units total) by mouth daily.      gentamicin 0.1 % External Ointment Use twice daily between toes until completely healed. (Patient not taking: Reported on 3/3/2025) 30 g 1       Allergies:  Allergies   Allergen Reactions    Penicillins UNKNOWN, HIVES and OTHER (SEE COMMENTS)         ROS:   Positive Findings indicated in BOLD    Constitutional: Fever, Chills, Weight Gain, Weight Loss, Night Sweats, Fatigue, Malaise  ENT/Mouth:  Hearing Changes, Ear Pain, Nasal Congestion, Sinus Pain, Hoarseness, Sore throat, Rhinorrhea, Swallowing Difficulty  Eyes: Eye Pain, Swelling, Redness, Foreign Body, Discharge, Vision Changes  Cardiovascular: Chest Pain, SOB, PND, Dyspnea on Exertion, Orthopnea, Claudication, Edema, Palpitations  Respiratory: Cough, Sputum, Wheezing, Shortness of breath  Gastrointestinal: Nausea, Vomiting, Diarrhea, Constipation, Pain, Heartburn, Dysphagia, Bloody stools, Tarry stools  Genitourinary: Dysmenorrhea, Dysuria, Urinary Frequency, Hematuria, Urinary Incontinence, Urgency,  Flank  Pain  Musculoskeletal: Arthralgias, Myalgias, Joint Swelling, Joint Stiffness, Back Pain, Neck Pain, foot pain  Integumentary: Skin Lesions, Pruritis, Hair Changes, Jaundice, Nail changes  Neuro: Weakness, Numbness, Paresthesias, Loss of Consciousness, Syncope, Dizziness, Headache, Falls  Psych: Anxiety, Depression, Insomnia, Suicidal Ideation, Homicidal ideation, Memory Changes  Heme/Lymph: Bruising, Bleeding, Lymphadenopathy  Endocrine: Polyuria, Polydipsia, Temperature Intolerance    PHYSICAL EXAM:   Vital Signs:  Blood pressure 110/76, pulse 69, temperature 97.1 °F (36.2 °C), temperature source Tympanic, height 5' 11\" (1.803 m), weight 204 lb (92.5 kg), SpO2 99%.     Constitutional: No acute distress. Alert and oriented x 3.  Eyes: EOMI, PERRLA, clear sclera b/l  HENT: NCAT, Moist mucous membranes, Oropharynx without erythema or exudates  Neck: No JVD, no thyromegaly  Cardiovascular: S1, S2, no S3, no S4, Regular rate and rhythm, No murmurs/gallops/rubs.   Vascular: Equal pulses 2+ carotids no bruits or thrills/radial/DP/PT bilaterally  Respiratory: Clear to auscultation bilaterally.  No wheezes/rales/rhonchi  Gastrointestinal: Soft, nontender, nondistended. Positive bowel sounds x 4. No rebound tenderness. No hepatomegaly, No splenomegaly  Genitourinary: No CVA tenderness bilaterally  Neurologic: No focal neurological deficits, CN II-XII intact, light touch intact, MSK Strength 5/5 and symmetric in all extremities, normal gait, 2+ patellar tendon  Musculoskeletal: Full range of motion of all extremities, no clubbing/swelling/edema  Skin: + Small scattered subcutaneous nodules, nontender, most prominent in the right medial forearm- mobile without overlying skin     + Interval development of multiple skin nodules 3 palpable in the middle abdomen, larger in the right flank    + Varicose veins bilateral thighs, more so on the left knee  Psychiatric: Appropriate mood and affect  Heme/Lymph/Immune: No cervical  LAD      DATA REVIEWED   Labs:  No results found for this or any previous visit (from the past 8760 hours).    No results found for this or any previous visit (from the past 8760 hours).    Cholesterol, Total (mg/dL)   Date Value   12/08/2022 184     HDL Cholesterol (mg/dL)   Date Value   12/08/2022 57     LDL Cholesterol (mg/dL)   Date Value   12/08/2022 113 (H)     Triglycerides (mg/dL)   Date Value   12/08/2022 73       Last A1c value was  % done  .      Colonoscopy 12/7/2021  A.  Transverse colon polyp:  -Tubular adenoma.    B.  Sigmoid colon polyp:  -Hyperplastic polyp.    ASSESSMENT/PLAN:     Subcutaneous nodules  Family history of pancreatic cancer  Conducted a brief skin examination with patient:  - Has had interval increase in skin nodules that he can detect.  I am able to appreciate the right forearm  - There has been more subcutaneous cyst versus lipomas 3 in the middle abdomen, right flank that is more prominent that was not appreciated on last year's exam  - We will proceed with CT scan abdomen and pelvis to ensure there is no underlying malignancy given family history of pancreatic cancer in his mother.    Hyperlipidemia  Last lipid panel with borderline LDL cholesterol 113  - Repeat fasting lipid panel  - Try to target weight loss over time with good nutrition and exercise      Nocturia  Seems to be relatively new symptom, eating to urinate 0-upwards of 2 times in the evening.  Worse with drinking fluids right before bedtime.  - He does have family history of prostate cancer in his maternal grandfather  - Last PSA within normal notes, will repeat    Osteoarthritis of the knees  Some crepitus noted on examination, however patient still able to perform his usual ADLs  - Continue with good exercise habits    Allergic rhinitis  -Well-controlled without the use of medications    History of colon polyps  - Tubular adenoma noted 12/2021  - Due for colonoscopy 2028 with Dr. Pantoja    For management of  hemorrhoids, goal is to maintain regular soft bowel movements  - Aggressive bowel regimen:     1.  Conservative measures include staying well-hydrated, eating high-fiber foods including fruits/vegetables.  2.  Use of Colace  3.  Addition of a fiber supplement/stool softener.  We recommend over-the-counter MiraLAX or Metamucil 1 dose in the morning  4.  If no improvement, add Senokot 1 tablet in the morning  5.  If still no improvement, you may need to double the dose of MiraLAX/Metamucil.  One dose in the morning and 1 dose in the evening  6.  If still no improvement, Take 2 tablets in the morning of Senokot  7. If still no improvement, would benefit from 1 dose of milk of magnesia     -May need to continue to consider additional GI evaluation if we cannot control symptoms further      Otalgia  - ENT visit 12/17/2024 after fullness in the ears and pain after a flight  - Recommended Afrin nasal spray 2 sprays each nostril per Dr. Leija    Anxiety  -Noted patient reported prior history requiring medications in the past  - Overall well controlled without the need for therapy or medications         Orders This Visit:  Orders Placed This Encounter   Procedures    CBC With Differential With Platelet    Comp Metabolic Panel (14)    PSA Total, Screen    Lipid Panel    TSH W Reflex To Free T4       Meds This Visit:  Requested Prescriptions      No prescriptions requested or ordered in this encounter       Imaging & Referrals:  CT ABDOMEN+PELVIS(CONTRAST ONLY)(CPT=74177)       Health Maintenance  HTN Screen: At goal  DM Screen: Check fasting sugar  HLD Screen: Check fasting lipid panel  Osteoporosis Screen (>65 or < 65 with FRAX > 9.3%): Not indicated  HCV Screen: Considered low risk  HIV Screen: considered low risk  G/C/Syphilis: Considered low risk    Colon Cancer Screening (45-70): As above, next due 2028  Prostate Cancer Screening: (50-70): Check PSA  Lung Cancer Screening (55-79 with 30 p/year and active < 15 years):  Not indicated  AAA Screening (65-75 Hx of smoking): Not indicated    Influenza: Annually   Td/Tdap: Last Tdap assess at next visit.   Zoster (50+): Not indicated  HPV (19-26): Not indicated  Pneumococcal: Not indicated    Immunization History   Administered Date(s) Administered    Covid-19 Vaccine Pfizer 30 mcg/0.3 ml 04/20/2021, 05/11/2021, 12/21/2021    FLULAVAL 6 months & older 0.5 ml Prefilled syringe (60036) 12/06/2022    FLUZONE 6 months and older PFS 0.5 ml (91142) 11/19/2021, 01/22/2024         Return to clinic in 6-12 months for follow-up    Jorge Montanez MD, 01/22/24, 1:23 PM

## 2025-03-02 NOTE — PATIENT INSTRUCTIONS
- You were seen in clinic for regular annual check-up. We have ordered labs for you and we will call you with the results. Please obtain the bloodwork fasting for 10**12 hours. OK to drink water the day of your blood draw.      We have the lab downstairs on the first floor.  No appointment is necessary.  Lab hours are Monday-Friday 7:30 AM to 4:45 PM.  There may be Saturday hours 7 AM-11:00 AM as well.     Otherwise you can obtain the blood tests on the weekends at the main hospital or local immediate care/urgent care within the [facility].    We did place a CT scan of the abdomen pelvis as there are new skin cysts versus lipomas.  Low suspicion for malignancy such as pancreatic cancer, however since there is family history patient proceed with CT abdomen and pelvis.    - These are likely lipomas of the right forearm, right abdomen, right thigh.  No immediate excision is needed.  As long as they remain the same size and not impacting  daily activities, we can monitor for now    For management of hemorrhoids, goal is to maintain regular soft bowel movements  - Aggressive bowel regimen:     1.  Conservative measures include staying well-hydrated, eating high-fiber foods including fruits/vegetables.  2.  Use of Colace  3.  Addition of a fiber supplement/stool softener.  We recommend over-the-counter MiraLAX or Metamucil 1 dose in the morning  4.  If no improvement, add Senokot 1 tablet in the morning  5.  If still no improvement, you may need to double the dose of MiraLAX/Metamucil.  One dose in the morning and 1 dose in the evening  6.  If still no improvement, Take 2 tablets in the morning of Senokot  7. If still no improvement, would benefit from 1 dose of milk of magnesia     -May need to continue to consider additional GI evaluation if we cannot control symptoms further    -You are eligible for early prostate cancer screening.  Will periodically perform a prostate exam for future physicals.  Lets  continue with checking PSA on an annual basis  -Next colonoscopy will be due 2028  -Vaccines may be due for: Tdap/tetanus shot, flu shot (given today), COVID dose #4  - Please continue to eat a varied diet including recommended servings of vegetables, fruits, and low fat dairy. Minimize high saturated fats (such as fast foods) and high sugar intake (such as soda)  - We recommend 150 minutes of moderate intensity exercise (brisk walking, swimming) weekly to maintain your current weight.  Targeted weight loss will require more vigorous exercise or more than 150 minutes/week.    Return to clinic in 6-12 months for follow-up

## 2025-03-03 ENCOUNTER — OFFICE VISIT (OUTPATIENT)
Dept: INTERNAL MEDICINE CLINIC | Facility: CLINIC | Age: 50
End: 2025-03-03
Payer: COMMERCIAL

## 2025-03-03 VITALS
DIASTOLIC BLOOD PRESSURE: 76 MMHG | HEIGHT: 71 IN | WEIGHT: 204 LBS | OXYGEN SATURATION: 99 % | TEMPERATURE: 97 F | HEART RATE: 69 BPM | BODY MASS INDEX: 28.56 KG/M2 | SYSTOLIC BLOOD PRESSURE: 110 MMHG

## 2025-03-03 DIAGNOSIS — Z13.0 SCREENING FOR DEFICIENCY ANEMIA: ICD-10-CM

## 2025-03-03 DIAGNOSIS — Z13.1 SCREENING FOR DIABETES MELLITUS: ICD-10-CM

## 2025-03-03 DIAGNOSIS — Z13.29 SCREENING FOR THYROID DISORDER: ICD-10-CM

## 2025-03-03 DIAGNOSIS — Z13.220 SCREENING FOR LIPOID DISORDERS: ICD-10-CM

## 2025-03-03 DIAGNOSIS — Z80.0 FAMILY HISTORY OF PANCREATIC CANCER: ICD-10-CM

## 2025-03-03 DIAGNOSIS — Z00.00 ANNUAL PHYSICAL EXAM: Primary | ICD-10-CM

## 2025-03-03 DIAGNOSIS — R22.9 MULTIPLE SKIN NODULES: ICD-10-CM

## 2025-03-03 DIAGNOSIS — M15.0 PRIMARY OSTEOARTHRITIS INVOLVING MULTIPLE JOINTS: ICD-10-CM

## 2025-03-03 DIAGNOSIS — Z86.0100 HISTORY OF COLON POLYPS: ICD-10-CM

## 2025-03-03 DIAGNOSIS — J30.2 SEASONAL ALLERGIC RHINITIS, UNSPECIFIED TRIGGER: ICD-10-CM

## 2025-03-03 DIAGNOSIS — Z12.5 SCREENING FOR PROSTATE CANCER: ICD-10-CM

## 2025-03-03 PROCEDURE — 99396 PREV VISIT EST AGE 40-64: CPT | Performed by: INTERNAL MEDICINE

## 2025-03-15 ENCOUNTER — HOSPITAL ENCOUNTER (OUTPATIENT)
Dept: CT IMAGING | Facility: HOSPITAL | Age: 50
Discharge: HOME | End: 2025-03-15
Attending: INTERNAL MEDICINE
Payer: COMMERCIAL

## 2025-03-15 DIAGNOSIS — R22.9 MULTIPLE SKIN NODULES: ICD-10-CM

## 2025-03-15 DIAGNOSIS — Z80.0 FAMILY HISTORY OF PANCREATIC CANCER: ICD-10-CM

## 2025-03-15 LAB
CREAT BLD-MCNC: 1 MG/DL
EGFRCR SERPLBLD CKD-EPI 2021: 92 ML/MIN/1.73M2 (ref 60–?)

## 2025-03-15 PROCEDURE — 82565 ASSAY OF CREATININE: CPT

## 2025-03-15 PROCEDURE — 74177 CT ABD & PELVIS W/CONTRAST: CPT | Performed by: INTERNAL MEDICINE

## 2025-03-21 ENCOUNTER — TELEPHONE (OUTPATIENT)
Dept: INTERNAL MEDICINE CLINIC | Facility: CLINIC | Age: 50
End: 2025-03-21

## 2025-03-21 NOTE — TELEPHONE ENCOUNTER
I called the patient with CT scan results  - Large amount of excessive stool seen in the colon.  Tiny umbilical hernia with mesenteric fat.  Diverticulosis without diverticulitis.  No kidney stones, nor obstruction.    He may have been told about the hiatal hernia with GERD symptoms. Maybe related to food intake. Needs some PPI if it flares up. May need to drink more water. Will try to cut down on tea.    He has regular bowel movements.  He is trying to adjust some of his nutrition intake.  Will obtain his labs after he comes back from spring break.